# Patient Record
Sex: MALE | Race: WHITE
[De-identification: names, ages, dates, MRNs, and addresses within clinical notes are randomized per-mention and may not be internally consistent; named-entity substitution may affect disease eponyms.]

---

## 2020-09-19 ENCOUNTER — HOSPITAL ENCOUNTER (EMERGENCY)
Dept: HOSPITAL 65 - ER | Age: 20
Discharge: HOME | End: 2020-09-19
Payer: COMMERCIAL

## 2020-09-19 VITALS — BODY MASS INDEX: 29.4 KG/M2 | HEIGHT: 71 IN | WEIGHT: 210 LBS

## 2020-09-19 VITALS — SYSTOLIC BLOOD PRESSURE: 151 MMHG | DIASTOLIC BLOOD PRESSURE: 93 MMHG

## 2020-09-19 VITALS — DIASTOLIC BLOOD PRESSURE: 95 MMHG | SYSTOLIC BLOOD PRESSURE: 143 MMHG
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
## 2020-09-19 DIAGNOSIS — S61.210A: Primary | ICD-10-CM
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
## 2020-09-19 PROCEDURE — 73130 X-RAY EXAM OF HAND: CPT

## 2020-09-19 PROCEDURE — 12001 RPR S/N/AX/GEN/TRNK 2.5CM/<: CPT

## 2020-09-19 PROCEDURE — 99283 EMERGENCY DEPT VISIT LOW MDM: CPT

## 2020-09-19 NOTE — ER.PDOC
General


Chief Complaint:  Extremities


Stated Complaint:  LACERATION TO FINGER


Time seen by MD:  15:16


Source:  patient


Exam Limitations:  no limitations





History of Present Illness


Initial Comments


crush injury to distal Index finger.


Occurred:  just prior to arrival


Context:  dropped a transmission on finger


Where:  home


Severity:  moderate


Modifying Factors:  pain on movement


Allergies:  


Coded Allergies:  


     No Known Allergies (Unverified , 9/19/20)





Past Medical History


Medical History:  no pertinent history


Surgical History:  tonsillectomy





Social History


Alcohol Use:  rarely


Drug Use:  none





Reviewed


Nursing Reviewed:  Vital Signs, Abn. Noted, Nursing Assessment





Review of Systems


Constitutional:  no symptoms reported


EENTM:  no symptoms reported


Respiratory:  no symptoms reported


Cardiovascular:  no symptoms reported


Gastrointestinal:  no symptoms reported


Genitourinary:  no symptoms reported


Musculoskeletal:  other (pain index finger on right)


Skin:  other (small lac on tip of index finger)


Psychiatric/Neurological:  no symptoms reported


All Other Systems:  Reviewed and Negative





Physical Exam


General Appearance:  Alert, No Apparent Distress


Hand:  tenderness, swelling


Comments


small 4 mm lac on tip of finger





ED LACERATION WOUND REPAIR


# of Wounds/Lacerations Presen:  1


Wound Location & Length (Requi:  Index finger 4mm


Wound Length (cm):  0


Wound cleaned:  betadine


Distal NVT:  neuro intact, vasc intact


Anesthesia type:  Not Applicable/None


Wound's Depth, Shape:  superficial, linear


Wound Explored:  contaminated


Tendon Intact:  Yes


Wound Debrided:  minimal


Wound Repaired With:  dermabond


Layer Closure?:  No


Retention sutures placed:  No


Sterile Dressing Applied?:  Yes





Results/Orders


Results/Orders





Orders - KARISHMA MONTES DO


Xr Hand Rt (9/19/20 15:05)





Vital Signs








  Date Time  Temp Pulse Resp B/P (MAP) Pulse Ox O2 Delivery O2 Flow Rate FiO2


 


9/19/20 15:04 98.5 92 18 151/93 (112) 99 Room Air  


 


9/19/20 15:02 98.5 92 18  99   


 


9/19/20 15:02 98.5 92 18     


 


9/19/20 15:02 98.5 92 18 151/93 (112) 99 Room Air  











ER DEPART


Departure


Time of Disposition:  15:20


Disposition:  01 HOME, SELF-CARE


Impression:  


   Primary Impression:  


   Contusion


   Additional Impression:  


   Laceration of finger of right hand


Condition:  Stable


Patient Instructions:  Fingertip Laceration


Referrals:  


TASHA DEXTER MD (PCP)


PRIMARY CARE PROVIDER





Additional Instructions:  


as needed follow up with PCP


Duration or Time Spent with Pa:  15





Return to Work/School


Can a patient return to work?:  Yes





Problem Qualifiers








   Primary Impression:  


   Contusion


   Encounter type:  initial encounter  Contusion area:  finger  Finger:  index 

   finger  Damage to nail status:  without damage  Laterality:  right  Qualified

   Codes:  S60.021A - Contusion of right index finger without damage to nail, 

   initial encounter


   Additional Impression:  


   Laceration of finger of right hand


   Encounter type:  initial encounter  Finger:  index finger  Damage to nail 

   status:  without damage  Foreign body presence:  without foreign body  

   Qualified Codes:  S61.210A - Laceration without foreign body of right index 

   finger without damage to nail, initial encounter








KARISHMA MONTES DO              Sep 19, 2020 15:21

## 2020-09-19 NOTE — DIREP
PROCEDURE:XRAY HAND MIN 3 VW-RT

 

COMPARISON:None.

 

INDICATIONS:RIGHT INDEX FINGER CRUSH INJURY

 

FINDINGS:

BONES:Normal.

JOINTS:Normal.

SOFT TISSUES:Mild soft tissue swelling of the index finger.

OTHER:No additional findings.

 

CONCLUSION:Soft tissue swelling of the index finger without acute bony 

abnormality.

 

 

 

Dictated by: Tyrone Washburn MD. on 09/19/2020 at 03:19 PM     

Electronically Signed By: Tyrone Washburn MD. on 09/19/2020 at 03:20 PM

## 2020-09-19 NOTE — NUR
ARRIVAL

PATIENT ARRIVED TO [facility] AMBULATORY, C/O OF RIGHT INDEX FINGER INJURY, PATIENT 
STATES HE WAS WORKING ON A CAR WHEN THE TRANSMISSION FELL HITTING HIS RIGHT 
INDEX FINGER, SMALL LACERATION NOTED, WOUND CLEANED WITH STERILE WATER, DOCTOR 
SIMON NOTIFIED OF PATIENT'S ARRIVAL.